# Patient Record
Sex: MALE | Race: WHITE | NOT HISPANIC OR LATINO | URBAN - NONMETROPOLITAN AREA
[De-identification: names, ages, dates, MRNs, and addresses within clinical notes are randomized per-mention and may not be internally consistent; named-entity substitution may affect disease eponyms.]

---

## 2023-11-21 ENCOUNTER — OFFICE (OUTPATIENT)
Dept: URBAN - NONMETROPOLITAN AREA CLINIC 1 | Facility: CLINIC | Age: 58
End: 2023-11-21

## 2023-11-21 VITALS
HEIGHT: 76 IN | SYSTOLIC BLOOD PRESSURE: 120 MMHG | DIASTOLIC BLOOD PRESSURE: 83 MMHG | HEART RATE: 82 BPM | WEIGHT: 229 LBS

## 2023-11-21 DIAGNOSIS — F41.9 ANXIETY DISORDER, UNSPECIFIED: ICD-10-CM

## 2023-11-21 DIAGNOSIS — Z86.010 PERSONAL HISTORY OF COLONIC POLYPS: ICD-10-CM

## 2023-11-21 DIAGNOSIS — E78.5 HYPERLIPIDEMIA, UNSPECIFIED: ICD-10-CM

## 2023-11-21 NOTE — SERVICEHPINOTES
He comes in to schedule a colonoscopy, for colon polyp surveillance.  He has normal bowel movements.  No abdominal pain or blood with his stools.  He has a good appetite and has not had unexpected weight loss.  Maternal grandfather had colon cancer.  His chronic illnesses include anxiety and hyperlipidemia.
br
br
br  Last colonoscopy 9/23/20 by Dr. Howard-
sherri Findings: 3 millimeter broad-based transverse colon polyp removed with cold snare technique and retrieved. Two 3 millimeter broad-based sigmoid colon polyps removed with cold snare technique and retrieved. Otherwise remainder the colon appeared normal.
brFinal Pathologic Diagnosis:br  1. TRANSVERSE COLON, BIOPSY:br   Tubular adenoma (adenomatous polyp) of the transverse colon.br 2. SIGMOID COLON, BIOPSY:br   Tubular adenomas (adenomatous polyps) of the sigmoid colon. 
br   Surveillance 3 years

## 2024-06-17 ENCOUNTER — ON CAMPUS - OUTPATIENT (OUTPATIENT)
Dept: URBAN - NONMETROPOLITAN AREA HOSPITAL 34 | Facility: HOSPITAL | Age: 59
End: 2024-06-17
Payer: COMMERCIAL

## 2024-06-17 DIAGNOSIS — Z09 ENCOUNTER FOR FOLLOW-UP EXAMINATION AFTER COMPLETED TREATMEN: ICD-10-CM

## 2024-06-17 DIAGNOSIS — Z86.010 PERSONAL HISTORY OF COLONIC POLYPS: ICD-10-CM

## 2024-06-17 PROCEDURE — G0105 COLORECTAL SCRN; HI RISK IND: HCPCS | Performed by: INTERNAL MEDICINE
